# Patient Record
(demographics unavailable — no encounter records)

---

## 2024-10-29 NOTE — HISTORY OF PRESENT ILLNESS
[FreeTextEntry1] : Patient is experiencing right axilla swelling.   Patient has a history for left breast cancer  She feels well  Denies palpable mass, pain, skin thickening

## 2024-10-29 NOTE — ASSESSMENT
[FreeTextEntry1] : History for left breast cancer  No evidence of recurrence clinically or on ultrasound  Annual mammogram  Follow-up 6 months  A total of 20 minutes was spent in consultation

## 2024-10-29 NOTE — PROCEDURE
[FreeTextEntry3] : Bilateral breast ultrasound  Patient has a history for left breast cancer  Four-quadrant survey of the left breast demonstrates no developing mass  Four-quadrant survey the right breast demonstrates no developing mass  There is no suspicious lymphadenopathy    BI-RADS 2

## 2025-04-09 NOTE — PHYSICAL EXAM
[Appropriately responsive] : appropriately responsive [Alert] : alert [No Acute Distress] : no acute distress [Soft] : soft [Non-tender] : non-tender [Non-distended] : non-distended [No HSM] : No HSM [No Lesions] : no lesions [No Mass] : no mass [Oriented x3] : oriented x3 [Examination Of The Breasts] : a normal appearance [No Masses] : no breast masses were palpable [Vulvar Atrophy] : vulvar atrophy [Labia Majora] : normal [Labia Minora] : normal [Atrophy] : atrophy [Normal] : normal [Uterine Adnexae] : non-palpable [No Tenderness] : no tenderness [FreeTextEntry6] : sp left breast lumpectomy [Tenderness] : nontender [Enlarged ___ wks] : not enlarged [Mass ___ cm] : no uterine mass was palpated [FreeTextEntry9] : guaiac-

## 2025-04-09 NOTE — HISTORY OF PRESENT ILLNESS
[Previously active] : previously active [TextBox_4] : Thyroid removed 12/2/2021 for nodules Hx of left breast cancer sp lumpectomy and gets sonos done by Dr. Guerrero. Had stereotactic bx in 12/2024 benign Takes vit d and exercises no vb [Mammogramdate] : 12/2024 [TextBox_19] : needed stereotactic bx [BreastSonogramDate] : 7/2022 [PapSmeardate] : NA [BoneDensityDate] : 1/2024 [TextBox_37] : normal [ColonoscopyDate] : 12/2014 [TextBox_43] : due